# Patient Record
Sex: FEMALE | Race: WHITE | NOT HISPANIC OR LATINO | Employment: OTHER | URBAN - METROPOLITAN AREA
[De-identification: names, ages, dates, MRNs, and addresses within clinical notes are randomized per-mention and may not be internally consistent; named-entity substitution may affect disease eponyms.]

---

## 2022-03-06 ENCOUNTER — HOSPITAL ENCOUNTER (EMERGENCY)
Facility: HOSPITAL | Age: 87
Discharge: HOME | End: 2022-03-06
Attending: INTERNAL MEDICINE
Payer: MEDICARE

## 2022-03-06 VITALS
OXYGEN SATURATION: 98 % | BODY MASS INDEX: 25.61 KG/M2 | RESPIRATION RATE: 20 BRPM | SYSTOLIC BLOOD PRESSURE: 158 MMHG | DIASTOLIC BLOOD PRESSURE: 74 MMHG | TEMPERATURE: 98 F | HEART RATE: 90 BPM | HEIGHT: 64 IN | WEIGHT: 150 LBS

## 2022-03-06 DIAGNOSIS — M79.603 ARM PAIN: ICD-10-CM

## 2022-03-06 PROCEDURE — 73090 X-RAY EXAM OF FOREARM: CPT | Mod: TC,FY,RT

## 2022-03-06 PROCEDURE — 73090 XR FOREARM RIGHT: ICD-10-PCS | Mod: 26,RT,, | Performed by: RADIOLOGY

## 2022-03-06 PROCEDURE — 99283 EMERGENCY DEPT VISIT LOW MDM: CPT | Mod: 25

## 2022-03-06 PROCEDURE — 73090 X-RAY EXAM OF FOREARM: CPT | Mod: 26,RT,, | Performed by: RADIOLOGY

## 2022-03-06 RX ORDER — HYDROCODONE BITARTRATE AND ACETAMINOPHEN 5; 325 MG/1; MG/1
1 TABLET ORAL EVERY 6 HOURS PRN
COMMUNITY

## 2022-03-06 RX ORDER — METOPROLOL SUCCINATE 50 MG/1
50 TABLET, EXTENDED RELEASE ORAL DAILY
COMMUNITY

## 2022-03-06 RX ORDER — PIOGLITAZONEHYDROCHLORIDE 15 MG/1
15 TABLET ORAL DAILY
COMMUNITY

## 2022-03-06 RX ORDER — ALPRAZOLAM 0.5 MG/1
0.5 TABLET ORAL 3 TIMES DAILY
COMMUNITY

## 2022-03-06 NOTE — ED PROVIDER NOTES
Encounter Date: 3/6/2022       History     Chief Complaint   Patient presents with    Wrist Pain     Patient attempted to break a fall when her  fell 3 days ago. Patient has had severe rt wrist pain x 3 days .       fell 3 days ago and she tried to catch him. Has vicente having pain since that time. Unable to use right arm. Has been taking tylenol for the pain although she does have hydrocodone that she takes routinely for chronic back pain.         Review of patient's allergies indicates:   Allergen Reactions    Hydrochlorothiazide     Nsaids (non-steroidal anti-inflammatory drug)      History reviewed. No pertinent past medical history.  History reviewed. No pertinent surgical history.  History reviewed. No pertinent family history.  Social History     Tobacco Use    Smoking status: Never Smoker    Smokeless tobacco: Never Used     Review of Systems   Constitutional: Negative for fever.   HENT: Negative for sore throat.    Respiratory: Negative for shortness of breath.    Cardiovascular: Negative for chest pain.   Gastrointestinal: Negative for nausea.   Genitourinary: Negative for dysuria.   Musculoskeletal: Negative for back pain.        Pain right arm   Skin: Negative for rash.   Neurological: Negative for weakness.   Hematological: Does not bruise/bleed easily.       Physical Exam     Initial Vitals [03/06/22 1541]   BP Pulse Resp Temp SpO2   (!) 158/74 90 20 98.3 °F (36.8 °C) 98 %      MAP       --         Physical Exam    Nursing note and vitals reviewed.  Constitutional: She appears well-developed and well-nourished.   HENT:   Head: Normocephalic and atraumatic.   Eyes: EOM are normal.   Neck: Neck supple.   Cardiovascular: Normal rate and regular rhythm.   Pulmonary/Chest: Breath sounds normal. She has no wheezes.   Abdominal: Abdomen is soft. There is no abdominal tenderness.   Musculoskeletal:      Cervical back: Neck supple.      Comments: + swelling right wrist/forearm. Pain on ROM or  palpation     Neurological: She is alert and oriented to person, place, and time.   Skin: Skin is warm and dry. Capillary refill takes less than 2 seconds.   Psychiatric: She has a normal mood and affect.         ED Course   Procedures  Labs Reviewed - No data to display       Imaging Results          X-Ray Forearm Right (Final result)  Result time 03/06/22 16:12:48    Final result by Brando García MD (03/06/22 16:12:48)                 Impression:      Mild soft tissue swelling without acute fracture.      Electronically signed by: Brando García  Date:    03/06/2022  Time:    16:12             Narrative:    EXAMINATION:  XR FOREARM RIGHT    CLINICAL HISTORY:  Pain in arm, unspecified    TECHNIQUE:  AP and lateral views of the right forearm were performed.    COMPARISON:  None    FINDINGS:  No acute fracture or dislocation.  Mild soft tissue swelling of the distal forearm.  No joint effusion identified at the elbow.    No radiopaque foreign body.                                 Medications - No data to display                       Clinical Impression:   Final diagnoses:  [M79.603] Arm pain          ED Disposition Condition    Discharge Good        ED Prescriptions     None        Follow-up Information    None          CARLTON Bustamante  03/06/22 3986

## 2022-03-10 ENCOUNTER — TELEPHONE (OUTPATIENT)
Dept: ORTHOPEDICS | Facility: CLINIC | Age: 87
End: 2022-03-10
Payer: MEDICARE

## 2022-03-10 NOTE — TELEPHONE ENCOUNTER
I contacted patient's daughter, Esther. I told her to follow up with the patient's PCP. She stated her mother was from [place] and would not be able to get to her PCP in a timely manner. Her daughter asked if she should try to get her mother in with the daughter's PCP. I told the daughter to follow up with her PCP since the mother's PCP is out of state.     ----- Message from Artemio Avendano sent at 3/10/2022 12:26 PM CST -----  Regarding: pt seen in ED and wants to be seen ASAP, call daughter Esther   Contact: daughter Esther   pt seen in ED and wants to be seen ASAP, call jaqueline Santana

## 2022-03-15 ENCOUNTER — TELEPHONE (OUTPATIENT)
Dept: FAMILY MEDICINE | Facility: CLINIC | Age: 87
End: 2022-03-15
Payer: MEDICARE

## 2022-03-15 ENCOUNTER — HOSPITAL ENCOUNTER (EMERGENCY)
Facility: HOSPITAL | Age: 87
Discharge: HOME | End: 2022-03-15
Attending: EMERGENCY MEDICINE
Payer: MEDICARE

## 2022-03-15 VITALS
HEART RATE: 99 BPM | WEIGHT: 149 LBS | HEIGHT: 64 IN | OXYGEN SATURATION: 97 % | BODY MASS INDEX: 25.44 KG/M2 | DIASTOLIC BLOOD PRESSURE: 79 MMHG | RESPIRATION RATE: 20 BRPM | TEMPERATURE: 99 F | SYSTOLIC BLOOD PRESSURE: 178 MMHG

## 2022-03-15 DIAGNOSIS — M25.531 WRIST PAIN, ACUTE, RIGHT: Primary | ICD-10-CM

## 2022-03-15 PROCEDURE — 25000003 PHARM REV CODE 250: Performed by: EMERGENCY MEDICINE

## 2022-03-15 PROCEDURE — 99283 EMERGENCY DEPT VISIT LOW MDM: CPT | Mod: 25

## 2022-03-15 RX ORDER — OXYCODONE AND ACETAMINOPHEN 5; 325 MG/1; MG/1
1 TABLET ORAL EVERY 4 HOURS PRN
Qty: 15 TABLET | Refills: 0 | Status: SHIPPED | OUTPATIENT
Start: 2022-03-15

## 2022-03-15 RX ORDER — OXYCODONE AND ACETAMINOPHEN 5; 325 MG/1; MG/1
1 TABLET ORAL
Status: COMPLETED | OUTPATIENT
Start: 2022-03-15 | End: 2022-03-15

## 2022-03-15 RX ADMIN — OXYCODONE HYDROCHLORIDE AND ACETAMINOPHEN 1 TABLET: 5; 325 TABLET ORAL at 11:03

## 2022-03-15 NOTE — TELEPHONE ENCOUNTER
----- Message from Jame SURENDRA Brown sent at 3/15/2022  7:32 AM CDT -----  Contact: King/Esther Love  Type: Needs Medical Advice  Who Called:  King/Esther Love  Symptoms (please be specific):  sprang right wrist  How long has patient had these symptoms:  3/5/22  Pharmacy name and phone #:  n/a  Best Call Back Number: [phone]  Additional Information: Esther called in and stated she had spoken to office about patient.  Patient is visiting from out of town & visited the ER at [facility].  Office had told Esther that if patient was not any better to call and bring her in.  Esther stated patient is in a lot of pain & really needs to be seen today 3/15/22.

## 2022-03-15 NOTE — ED TRIAGE NOTES
R wrist pain since 3/3 after trying to break 's fall. Seen here in ED for same on 3/6. Took Carmi for pain PTA. Here for pain control.

## 2022-03-15 NOTE — DISCHARGE INSTRUCTIONS
Take Percocet instead of Norco.  This medication may make you unstable on your feet and more likely to fall, so be very careful.  Do not drive or drink alcohol while taking this medication.  Follow-up with Dr. Joiner.  Call his office later today to schedule an appointment.  Tell the  that I have referred you to Dr. Joiner for visit.  Return here as needed or if worse in any way.

## 2022-03-15 NOTE — ED PROVIDER NOTES
Encounter Date: 3/15/2022       History     Chief Complaint   Patient presents with    Wrist Pain     Since 3/3. Seen here in ED on 3/6     Patient is an 86-year-old male seen here last week for a complaint of right arm pain.  Apparently the patient had tried to catch her  who was falling, and somehow injured her right arm.  She was seen here in x-rays were taken.  No fractures were evident.  She was placed into a Velcro splint and discharged home with [place] for unrelieved pain.  Patient states that over the course of the week, her pain started get better, but then 2 days ago became abruptly worse.  She did not sleep well last night because of the pain.  She has been wearing her splint as prescribed.  She states the splint does help.  When she takes it off, her wrist starts hurting worse after a short period of time.  Pain is also worse with movement of the thumb or the index finger.  Denies numbness or tingling.  Denies any other complaints.  She states the pain is focused on the thumb and in the hand at the base of the thumb.  Pain does radiate into the forearm.        Review of patient's allergies indicates:   Allergen Reactions    Hydrochlorothiazide     Nsaids (non-steroidal anti-inflammatory drug)      Past Medical History:   Diagnosis Date    Cancer     Chronic pain     Diabetes mellitus     Hypertension     Scoliosis      Past Surgical History:   Procedure Laterality Date    BACK SURGERY      COLOSTOMY      HYSTERECTOMY       History reviewed. No pertinent family history.  Social History     Tobacco Use    Smoking status: Never Smoker    Smokeless tobacco: Never Used     Review of Systems   Constitutional: Negative.    HENT: Negative.    Eyes: Negative.    Respiratory: Negative.    Cardiovascular: Negative.    Gastrointestinal: Negative.    Endocrine: Negative.    Genitourinary: Negative.    Musculoskeletal: Positive for arthralgias.   Skin: Negative.    Neurological: Negative.     Psychiatric/Behavioral: Negative.        Physical Exam     Initial Vitals   BP Pulse Resp Temp SpO2   03/15/22 1108 03/15/22 1105 03/15/22 1105 03/15/22 1105 03/15/22 1105   (!) 178/79 99 20 98.5 °F (36.9 °C) 97 %      MAP       --                Physical Exam    Nursing note and vitals reviewed.  Constitutional: She appears well-developed and well-nourished. She is not diaphoretic. No distress.   HENT:   Head: Normocephalic and atraumatic.   Nose: Nose normal.   Mouth/Throat: Oropharynx is clear and moist. No oropharyngeal exudate.   Eyes: Conjunctivae and EOM are normal. Pupils are equal, round, and reactive to light.   Neck: Neck supple. No JVD present.   Normal range of motion.  Cardiovascular: Normal rate, regular rhythm, normal heart sounds and intact distal pulses.   No murmur heard.  Pulmonary/Chest: Breath sounds normal. No stridor. No respiratory distress. She has no wheezes.   Abdominal: Abdomen is soft. Bowel sounds are normal. She exhibits no distension. There is no abdominal tenderness.   Musculoskeletal:         General: Tenderness present. No edema. Normal range of motion.      Cervical back: Normal range of motion and neck supple.      Comments: Patient was examined with her wrist splint in place.  She was reluctant to take it off.  She moves all fingers well, and has normal sensation in all fingers.  Capillary refill normal all fingers.  Movement of the index finger and the thumb increases the pain in the region of the 1st metacarpal, which she states radiates into the forearm.  No edema noted.     Neurological: She is alert and oriented to person, place, and time. She has normal strength and normal reflexes. No cranial nerve deficit or sensory deficit. GCS score is 15. GCS eye subscore is 4. GCS verbal subscore is 5. GCS motor subscore is 6.   Skin: Skin is warm and dry. Capillary refill takes less than 2 seconds. No rash noted. No erythema.   Psychiatric: She has a normal mood and affect. Her  behavior is normal.         ED Course   Procedures  Labs Reviewed - No data to display       Imaging Results    None          Medications   oxyCODONE-acetaminophen 5-325 mg per tablet 1 tablet (1 tablet Oral Given 3/15/22 114)     Medical Decision Making:   Differential Diagnosis:   Fracture, sprain, strain, ligament tear, cartilage injury, etc.  etc.  ED Management:  I reviewed the patient's x-ray and saw no evidence of obvious fracture.  I believe patient likely has a ligamentous injury, but an occult fractures not rule out.  Patient was given Percocet here and she reports that this gave her relief of her pain.  Will discharge home with Percocet.  She will take this instead of the Norco.  She cannot take NSAIDs.  I have referred the patient to orthopedist.  Patient will return here as needed or if worse in any way.                      Clinical Impression:   Final diagnoses:  [M25.531] Wrist pain, acute, right (Primary)          ED Disposition Condition    Discharge Stable        ED Prescriptions     Medication Sig Dispense Start Date End Date Auth. Provider    oxyCODONE-acetaminophen (PERCOCET) 5-325 mg per tablet Take 1 tablet by mouth every 4 (four) hours as needed for Pain. 15 tablet 3/15/2022  Ricardo Adams MD        Follow-up Information     Follow up With Specialties Details Why Contact Info    Neftali Joiner, DO Orthopedic Surgery Call   [address] [place] [address] [phone]             Ricardo Adams MD  03/15/22 0980